# Patient Record
Sex: MALE | Race: WHITE
[De-identification: names, ages, dates, MRNs, and addresses within clinical notes are randomized per-mention and may not be internally consistent; named-entity substitution may affect disease eponyms.]

---

## 2022-05-26 ENCOUNTER — HOSPITAL ENCOUNTER (EMERGENCY)
Dept: HOSPITAL 75 - ER FS | Age: 37
Discharge: HOME | End: 2022-05-26
Payer: COMMERCIAL

## 2022-05-26 VITALS — DIASTOLIC BLOOD PRESSURE: 86 MMHG | SYSTOLIC BLOOD PRESSURE: 136 MMHG

## 2022-05-26 VITALS — WEIGHT: 224.87 LBS | BODY MASS INDEX: 35.29 KG/M2 | HEIGHT: 66.93 IN
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
## 2022-05-26 DIAGNOSIS — Z23: ICD-10-CM
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
## 2022-05-26 DIAGNOSIS — S67.42XA: Primary | ICD-10-CM

## 2022-05-26 PROCEDURE — 73110 X-RAY EXAM OF WRIST: CPT

## 2022-05-26 PROCEDURE — 90715 TDAP VACCINE 7 YRS/> IM: CPT

## 2022-05-26 PROCEDURE — 73130 X-RAY EXAM OF HAND: CPT

## 2022-05-26 NOTE — DIAGNOSTIC IMAGING REPORT
CLINICAL INDICATION: Patient with crush injury.



EXAMS:

1: X-ray of the left hand, 3 views.

2: X-ray of the left wrist, 3 views.



COMPARISON: None.



FINDINGS: There is soft tissue swelling seen dorsal to the

metacarpal region on lateral view of the hand and wrist. X-rays

of the left hand and wrist shows no definite fracture or

dislocation. There is spurring of the base of a metacarpal bone

seen on lateral view.



IMPRESSION:

1: There is an area of bony spurring involving the dorsal base of

one of the metacarpal bone seen on lateral view. If there is

continued concern for fracture, then CT scan may help better

evaluate.



2: Otherwise, there is no definite acute fracture or dislocation.





3: There is soft tissue swelling seen dorsal to the metacarpal

region. 



Dictated by: 



  Dictated on workstation # [id number]

## 2022-05-26 NOTE — ED INTEGUMENTARY GENERAL
General


Stated Complaint:  WC,L HAND PAIN/LACS





History of Present Illness


Date Seen by Provider:  May 26, 2022


Time Seen by Provider:  19:28


Initial Comments


36-year-old male presents with left hand pain.  Patient was at work when he got 

his hand caught in a machine and stuck in between 2 rollers.  He reports his 

hand went all the way through and his arm up into his elbow.  He does report 

that rollers do have some give and its safety feature set the machine off as it 

was supposed to.  Patient complains of pain diffusely in his left hand.  He does

have full but painful range of motion.  He has a good feeling.  Yet he has some 

mild pain in his left wrist.  He has a small 1/2 cm laceration on his dorsal 

aspect of his middle finger and a small half centimeter laceration on the dorsal

aspect of his proximal hand.  Patient's last tetanus was 5 to 7 years ago.





Allergies and Home Medications


Allergies


Coded Allergies:  


     No Known Drug Allergies (Unverified , 5/26/22)





Patient Home Medication List


Home Medication List Reviewed:  Yes





Review of Systems


Review of Systems


Constitutional:  no symptoms reported, see HPI


EENTM:  no symptoms reported


Respiratory:  no symptoms reported


Cardiovascular:  no symptoms reported


Gastrointestinal:  no symptoms reported


Genitourinary:  no symptoms reported


Musculoskeletal:  see HPI


Skin:  see HPI


Psychiatric/Neurological:  No Symptoms Reported


Endocrine:  No Symptoms Reported





Physical Exam


Vital Signs





Vital Signs - First Documented








 5/26/22





 19:20


 


Temp 37.0


 


Pulse 68


 


Resp 18


 


B/P (MAP) 136/86 (103)


 


Pulse Ox 99


 


O2 Delivery Room Air





Capillary Refill :


General Appearance:  no apparent distress


Neck:  full range of motion, supple


Cardiovascular:  normal peripheral pulses, regular rate, rhythm


Respiratory:  lungs clear, normal breath sounds


Gastrointestinal:  non tender, soft


Extremities:  normal range of motion, normal capillary refill, other (Patient 

with normal range of motion in all fingers and wrist of his left hand)


Neurologic/Psychiatric:  alert, normal mood/affect, oriented x 3, other (Patient

with good sensation throughout his left hand wrist and forearm, with no deficits

noted)


Skin Problem Character:  other (Small laceration nonsuturable left middle finger

with a small laceration dorsal aspect proximal left hand)





Progress/Results/Core Measures


Results/Orders


My Orders





Orders - JASE FUENTES DO


Hand 3 View Left (5/26/22 19:29)


Wrist 3 View Left (5/26/22 19:29)


Dipht,Pertuss(Acell),Tet Adult (Boostrix (5/26/22 19:45)


Ketorolac Injection (Toradol Injection) (5/26/22 19:51)





Medications Given in ED





Current Medications








 Medications  Dose


 Ordered  Sig/Thony


 Route  Start Time


 Stop Time Status Last Admin


Dose Admin


 


 Diphtheria/


 Tetanus/Acell


 Pertussis  0.5 ml  ONCE ONCE


 IM  5/26/22 19:45


 5/26/22 19:46 DC 5/26/22 19:54


0.5 ML








Vital Signs/I&O











 5/26/22





 19:20


 


Temp 37.0


 


Pulse 68


 


Resp 18


 


B/P (MAP) 136/86 (103)


 


Pulse Ox 99


 


O2 Delivery Room Air











Progress


Progress Note :  


Progress Note


Patient with crush injury to his left hand wrist and forearm.  There is no signs

of compartment syndrome.  Patient's pain is mainly just in the distal fingers.  

Patient has good sensation range of motion, cap refill.  Patient has 2 small 

nonsuturable lacerations.  Patient was offered a work note but states he like to

go back to work tomorrow.  Patient stable and discharged





Departure


Impression





   Primary Impression:  


   Crushing injury of right wrist and hand, initial encounter


Disposition:  01 HOME, SELF-CARE


Condition:  Stable





Departure-Patient Inst.


Referrals:  


NO,LOCAL PHYSICIAN (PCP/Family)


Primary Care Physician


Patient Instructions:  Crush Injury (DC)





Add. Discharge Instructions:  


Ice for 15 to 20 minutes at a time every couple hours


Keep left hand elevated when not use


You may use 4% topical lidocaine with menthol as needed for pain


If you develop any tingling, severe numbness or inability to move fingers please

return to the ER for reevaluation or if you have any other concerns


Tylenol or ibuprofen as needed for pain and discomfort











JASE FUENTES DO               May 26, 2022 19:18

## 2022-05-26 NOTE — DIAGNOSTIC IMAGING REPORT
CLINICAL INDICATION: Patient with crush injury.



EXAMS:

1: X-ray of the left hand, 3 views.

2: X-ray of the left wrist, 3 views.



COMPARISON: None.



FINDINGS: There is soft tissue swelling seen dorsal to the

metacarpal region on lateral view of the hand and wrist. X-rays

of the left hand and wrist shows no definite fracture or

dislocation. There is spurring of the base of a metacarpal bone

seen on lateral view.



IMPRESSION:

1: There is an area of bony spurring involving the dorsal base of

one of the metacarpal bone seen on lateral view. If there is

continued concern for fracture, then CT scan may help better

evaluate.



2: Otherwise, there is no definite acute fracture or dislocation.





3: There is soft tissue swelling seen dorsal to the metacarpal

region. 



Dictated by: 



  Dictated on workstation # [id number]

## 2022-11-29 ENCOUNTER — HOSPITAL ENCOUNTER (OUTPATIENT)
Dept: HOSPITAL 75 - RAD | Age: 37
End: 2022-11-29
Attending: SURGERY
Payer: COMMERCIAL

## 2022-11-29 DIAGNOSIS — I86.1: Primary | ICD-10-CM

## 2022-11-29 PROCEDURE — 76870 US EXAM SCROTUM: CPT

## 2022-11-29 NOTE — DIAGNOSTIC IMAGING REPORT
PROCEDURE: US Scrotum.



TECHNIQUE: Multiple real-time grayscale images were obtained over

the scrotum in various projections bilaterally.



INDICATION: Pain and swelling in the scrotum. Status post

vasectomy one week ago.



COMPARISON: None.



FINDINGS:



The right testicle measures 4.6 x 2.6 x 3.0 cm in size.

Echogenicity and vascularity appear normal. There is a small

echogenic hydrocele, likely blood products. The epididymis

demonstrates mildly increased blood flow.



The left testicle measures 4.0 x 2.0 x 2.9 cm in size.

Echogenicity and vascularity are normal. There is no hydrocele.

There is a varicocele present. The epididymis is unremarkable.



IMPRESSION:

1. Heterogeneous, echogenic small right hydrocele, likely blood

products and hematoma.

2. Increased vascularity of the right epididymis, can be seen

with epididymitis.

3. No evidence of testicular torsion.

4. Left varicocele.



Dictated by: 



  Dictated on workstation # [id number]